# Patient Record
Sex: MALE | Race: WHITE
[De-identification: names, ages, dates, MRNs, and addresses within clinical notes are randomized per-mention and may not be internally consistent; named-entity substitution may affect disease eponyms.]

---

## 2018-05-22 ENCOUNTER — HOSPITAL ENCOUNTER (OUTPATIENT)
Dept: HOSPITAL 80 - FCATH | Age: 63
Discharge: HOME | End: 2018-05-22
Attending: INTERNAL MEDICINE
Payer: COMMERCIAL

## 2018-05-22 DIAGNOSIS — I48.91: Primary | ICD-10-CM

## 2018-05-22 DIAGNOSIS — R53.82: ICD-10-CM

## 2018-05-22 DIAGNOSIS — I44.7: ICD-10-CM

## 2018-05-22 LAB
INR PPP: 1.16 (ref 0.83–1.16)
PROTHROMBIN TIME: 15 SEC (ref 12–15)

## 2018-05-22 PROCEDURE — B245ZZ4 ULTRASONOGRAPHY OF LEFT HEART, TRANSESOPHAGEAL: ICD-10-PCS | Performed by: INTERNAL MEDICINE

## 2018-05-22 PROCEDURE — 5A2204Z RESTORATION OF CARDIAC RHYTHM, SINGLE: ICD-10-PCS | Performed by: INTERNAL MEDICINE

## 2018-05-22 NOTE — PDTEE1
SEBASTIAN Cardioversion Procedure


Procedure: electrical cardioversion, transesophageal echo


Indications: atrial fibrillation


Consent: signed and in chart


Anticoagulation: eliquis


Procedural Details: 


Pads were placed in anterior-posterior position.  SEBASTIAN probe was advanced and 

standard images obtained.  There is no evidence of left atrial or left atrial 

appendage thrombus.





Synchronized cardioversion attempt #1: 200J


Results: normal sinus rhythm


Conclusions: successful SEBASTIAN cardioversion (Bicuspid aortic valve with mild to 

moderate AI.)

## 2018-05-22 NOTE — CPEKG
Heart Rate: 95

RR Interval: 632

P-R Interval: 144

QRSD Interval: 120

QT Interval: 432

QTC Interval: 543

P Axis: 61

QRS Axis: 13

T Wave Axis: 176

EKG Severity - ABNORMAL ECG -

EKG Impression: SINUS RHYTHM

EKG Impression: VENTRICULAR PREMATURE COMPLEX

EKG Impression: LEFT BUNDLE BRANCH BLOCK

Electronically Signed By: Jeff Foster 24-May-2018 08:53:13

## 2018-05-22 NOTE — PDGENHP
History & Physical


Chief Complaint: Palpitations


History of Present Illness: 62-year-old history of bicuspid aortic valve here 

with acute onset of palpitations associated with vague chest discomfort.  He 

was seen at the [facility] diagnosed with atrial fibrillation a 

left bundle branch block and is referred for SEBASTIAN cardioversion.  He has been 

NPO since 6:00 a.m. He is anticoagulated with Eliquis


Relevant Physical Exam: 134 irregular.  Blood pressure 130/70.  No JVP.  Chest 

is clear to auscultation percussion.  Cardiac exam showed irregular irregular 

rhythm with a 2/6 systolic murmur.  Abdomen is soft nontender.  Extremities are 

free of edema.  Neurological he is alert and oriented with normal mood and 

affect.  He has a dysconjugate gaze.  Skin examination revealed no rash.


Cardiorespiratory Assessment: Atrial fibrillation with rapid ventricular 

response symptomatic with stable vital signs.  Plan for SEBASTIAN cardioversion 

today.  Anticoagulation for 30 days.  Clinical follow-up